# Patient Record
Sex: FEMALE | Race: WHITE | NOT HISPANIC OR LATINO | Employment: OTHER | URBAN - METROPOLITAN AREA
[De-identification: names, ages, dates, MRNs, and addresses within clinical notes are randomized per-mention and may not be internally consistent; named-entity substitution may affect disease eponyms.]

---

## 2021-06-13 ENCOUNTER — APPOINTMENT (OUTPATIENT)
Dept: CT IMAGING | Facility: HOSPITAL | Age: 86
End: 2021-06-13

## 2021-06-13 ENCOUNTER — APPOINTMENT (OUTPATIENT)
Dept: GENERAL RADIOLOGY | Facility: HOSPITAL | Age: 86
End: 2021-06-13

## 2021-06-13 ENCOUNTER — HOSPITAL ENCOUNTER (OUTPATIENT)
Facility: HOSPITAL | Age: 86
Setting detail: OBSERVATION
Discharge: NURSING, REHAB OR LONG-TERM CARE | End: 2021-06-14
Attending: INTERNAL MEDICINE | Admitting: NURSE PRACTITIONER

## 2021-06-13 DIAGNOSIS — S32.591A CLOSED FRACTURE OF MULTIPLE RAMI OF RIGHT PUBIS, INITIAL ENCOUNTER (HCC): ICD-10-CM
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
## 2021-06-13 DIAGNOSIS — S32.110A CLOSED NONDISPLACED ZONE I FRACTURE OF SACRUM, INITIAL ENCOUNTER (HCC): ICD-10-CM

## 2021-06-13 PROBLEM — S32.10XA SACRAL FRACTURE, CLOSED (HCC): Status: ACTIVE | Noted: 2021-06-13

## 2021-06-13 PROBLEM — F03.90 DEMENTIA (HCC): Chronic | Status: ACTIVE | Noted: 2021-06-13

## 2021-06-13 PROBLEM — M85.80 OSTEOPENIA: Chronic | Status: ACTIVE | Noted: 2021-06-13

## 2021-06-13 LAB
ALBUMIN SERPL-MCNC: 3.6 G/DL (ref 3.5–5.2)
ALBUMIN/GLOB SERPL: 1.3 G/DL
ALP SERPL-CCNC: 78 U/L (ref 39–117)
ALT SERPL W P-5'-P-CCNC: 12 U/L (ref 1–33)
ANION GAP SERPL CALCULATED.3IONS-SCNC: 9 MMOL/L (ref 5–15)
APTT PPP: 25 SECONDS (ref 24–31)
AST SERPL-CCNC: 18 U/L (ref 1–32)
BASOPHILS # BLD AUTO: 0.1 10*3/MM3 (ref 0–0.2)
BASOPHILS NFR BLD AUTO: 0.7 % (ref 0–1.5)
BILIRUB SERPL-MCNC: 0.5 MG/DL (ref 0–1.2)
BUN SERPL-MCNC: 39 MG/DL (ref 8–23)
BUN/CREAT SERPL: 33.1 (ref 7–25)
CALCIUM SPEC-SCNC: 9.9 MG/DL (ref 8.6–10.5)
CHLORIDE SERPL-SCNC: 106 MMOL/L (ref 98–107)
CO2 SERPL-SCNC: 28 MMOL/L (ref 22–29)
CREAT SERPL-MCNC: 1.18 MG/DL (ref 0.57–1)
DEPRECATED RDW RBC AUTO: 47.7 FL (ref 37–54)
EOSINOPHIL # BLD AUTO: 0.2 10*3/MM3 (ref 0–0.4)
EOSINOPHIL NFR BLD AUTO: 2.5 % (ref 0.3–6.2)
ERYTHROCYTE [DISTWIDTH] IN BLOOD BY AUTOMATED COUNT: 16.3 % (ref 12.3–15.4)
GFR SERPL CREATININE-BSD FRML MDRD: 43 ML/MIN/1.73
GLOBULIN UR ELPH-MCNC: 2.7 GM/DL
GLUCOSE SERPL-MCNC: 126 MG/DL (ref 65–99)
HCT VFR BLD AUTO: 37.3 % (ref 34–46.6)
HGB BLD-MCNC: 12.5 G/DL (ref 12–15.9)
HOLD SPECIMEN: NORMAL
INR PPP: <0.93 (ref 0.93–1.1)
LYMPHOCYTES # BLD AUTO: 1.4 10*3/MM3 (ref 0.7–3.1)
LYMPHOCYTES NFR BLD AUTO: 17.8 % (ref 19.6–45.3)
MAGNESIUM SERPL-MCNC: 2.2 MG/DL (ref 1.6–2.4)
MCH RBC QN AUTO: 28.3 PG (ref 26.6–33)
MCHC RBC AUTO-ENTMCNC: 33.4 G/DL (ref 31.5–35.7)
MCV RBC AUTO: 84.7 FL (ref 79–97)
MONOCYTES # BLD AUTO: 1 10*3/MM3 (ref 0.1–0.9)
MONOCYTES NFR BLD AUTO: 12.5 % (ref 5–12)
NEUTROPHILS NFR BLD AUTO: 5.4 10*3/MM3 (ref 1.7–7)
NEUTROPHILS NFR BLD AUTO: 66.5 % (ref 42.7–76)
NRBC BLD AUTO-RTO: 0 /100 WBC (ref 0–0.2)
PHOSPHATE SERPL-MCNC: 2.9 MG/DL (ref 2.5–4.5)
PLATELET # BLD AUTO: 298 10*3/MM3 (ref 140–450)
PMV BLD AUTO: 7.9 FL (ref 6–12)
POTASSIUM SERPL-SCNC: 4.6 MMOL/L (ref 3.5–5.2)
PROT SERPL-MCNC: 6.3 G/DL (ref 6–8.5)
PROTHROMBIN TIME: 10.1 SECONDS (ref 9.6–11.7)
RBC # BLD AUTO: 4.4 10*6/MM3 (ref 3.77–5.28)
SODIUM SERPL-SCNC: 143 MMOL/L (ref 136–145)
TROPONIN T SERPL-MCNC: <0.01 NG/ML (ref 0–0.03)
TSH SERPL DL<=0.05 MIU/L-ACNC: 0.01 UIU/ML (ref 0.27–4.2)
WBC # BLD AUTO: 8.2 10*3/MM3 (ref 3.4–10.8)

## 2021-06-13 PROCEDURE — 80053 COMPREHEN METABOLIC PANEL: CPT | Performed by: NURSE PRACTITIONER

## 2021-06-13 PROCEDURE — G0378 HOSPITAL OBSERVATION PER HR: HCPCS

## 2021-06-13 PROCEDURE — 85610 PROTHROMBIN TIME: CPT | Performed by: NURSE PRACTITIONER

## 2021-06-13 PROCEDURE — 85730 THROMBOPLASTIN TIME PARTIAL: CPT | Performed by: NURSE PRACTITIONER

## 2021-06-13 PROCEDURE — 72192 CT PELVIS W/O DYE: CPT

## 2021-06-13 PROCEDURE — 71045 X-RAY EXAM CHEST 1 VIEW: CPT

## 2021-06-13 PROCEDURE — 99219 PR INITIAL OBSERVATION CARE/DAY 50 MINUTES: CPT | Performed by: NURSE PRACTITIONER

## 2021-06-13 PROCEDURE — 84481 FREE ASSAY (FT-3): CPT | Performed by: INTERNAL MEDICINE

## 2021-06-13 PROCEDURE — 84443 ASSAY THYROID STIM HORMONE: CPT | Performed by: NURSE PRACTITIONER

## 2021-06-13 PROCEDURE — 83036 HEMOGLOBIN GLYCOSYLATED A1C: CPT | Performed by: NURSE PRACTITIONER

## 2021-06-13 PROCEDURE — U0005 INFEC AGEN DETEC AMPLI PROBE: HCPCS | Performed by: INTERNAL MEDICINE

## 2021-06-13 PROCEDURE — 99284 EMERGENCY DEPT VISIT MOD MDM: CPT

## 2021-06-13 PROCEDURE — 83735 ASSAY OF MAGNESIUM: CPT | Performed by: NURSE PRACTITIONER

## 2021-06-13 PROCEDURE — 84484 ASSAY OF TROPONIN QUANT: CPT | Performed by: NURSE PRACTITIONER

## 2021-06-13 PROCEDURE — 85025 COMPLETE CBC W/AUTO DIFF WBC: CPT | Performed by: NURSE PRACTITIONER

## 2021-06-13 PROCEDURE — U0004 COV-19 TEST NON-CDC HGH THRU: HCPCS | Performed by: INTERNAL MEDICINE

## 2021-06-13 PROCEDURE — 84100 ASSAY OF PHOSPHORUS: CPT | Performed by: NURSE PRACTITIONER

## 2021-06-13 RX ORDER — MAGNESIUM SULFATE HEPTAHYDRATE 40 MG/ML
2 INJECTION, SOLUTION INTRAVENOUS AS NEEDED
Status: DISCONTINUED | OUTPATIENT
Start: 2021-06-13 | End: 2021-06-14 | Stop reason: HOSPADM

## 2021-06-13 RX ORDER — ONDANSETRON 2 MG/ML
4 INJECTION INTRAMUSCULAR; INTRAVENOUS EVERY 6 HOURS PRN
Status: DISCONTINUED | OUTPATIENT
Start: 2021-06-13 | End: 2021-06-14 | Stop reason: HOSPADM

## 2021-06-13 RX ORDER — CHOLECALCIFEROL (VITAMIN D3) 125 MCG
5 CAPSULE ORAL NIGHTLY PRN
Status: DISCONTINUED | OUTPATIENT
Start: 2021-06-13 | End: 2021-06-14 | Stop reason: HOSPADM

## 2021-06-13 RX ORDER — MELATONIN
1000 DAILY
COMMUNITY

## 2021-06-13 RX ORDER — BUSPIRONE HYDROCHLORIDE 7.5 MG/1
7.5 TABLET ORAL 3 TIMES DAILY
COMMUNITY

## 2021-06-13 RX ORDER — CALCIUM GLUCONATE 20 MG/ML
1 INJECTION, SOLUTION INTRAVENOUS AS NEEDED
Status: DISCONTINUED | OUTPATIENT
Start: 2021-06-13 | End: 2021-06-14 | Stop reason: HOSPADM

## 2021-06-13 RX ORDER — HYDRALAZINE HYDROCHLORIDE 25 MG/1
25 TABLET, FILM COATED ORAL 3 TIMES DAILY
COMMUNITY

## 2021-06-13 RX ORDER — HYDROCODONE BITARTRATE AND ACETAMINOPHEN 7.5; 325 MG/1; MG/1
1 TABLET ORAL EVERY 4 HOURS PRN
Status: DISCONTINUED | OUTPATIENT
Start: 2021-06-13 | End: 2021-06-14 | Stop reason: HOSPADM

## 2021-06-13 RX ORDER — ONDANSETRON 4 MG/1
4 TABLET, FILM COATED ORAL EVERY 6 HOURS PRN
Status: DISCONTINUED | OUTPATIENT
Start: 2021-06-13 | End: 2021-06-14 | Stop reason: HOSPADM

## 2021-06-13 RX ORDER — PANTOPRAZOLE SODIUM 40 MG/1
40 TABLET, DELAYED RELEASE ORAL DAILY
COMMUNITY

## 2021-06-13 RX ORDER — ACETAMINOPHEN 650 MG/1
650 SUPPOSITORY RECTAL EVERY 4 HOURS PRN
Status: DISCONTINUED | OUTPATIENT
Start: 2021-06-13 | End: 2021-06-14 | Stop reason: HOSPADM

## 2021-06-13 RX ORDER — POTASSIUM CHLORIDE 20 MEQ/1
40 TABLET, EXTENDED RELEASE ORAL AS NEEDED
Status: DISCONTINUED | OUTPATIENT
Start: 2021-06-13 | End: 2021-06-14 | Stop reason: HOSPADM

## 2021-06-13 RX ORDER — NITROGLYCERIN 0.4 MG/1
0.4 TABLET SUBLINGUAL
Status: DISCONTINUED | OUTPATIENT
Start: 2021-06-13 | End: 2021-06-14 | Stop reason: HOSPADM

## 2021-06-13 RX ORDER — ALUMINA, MAGNESIA, AND SIMETHICONE 2400; 2400; 240 MG/30ML; MG/30ML; MG/30ML
15 SUSPENSION ORAL EVERY 6 HOURS PRN
Status: DISCONTINUED | OUTPATIENT
Start: 2021-06-13 | End: 2021-06-14 | Stop reason: HOSPADM

## 2021-06-13 RX ORDER — MAGNESIUM SULFATE 1 G/100ML
1 INJECTION INTRAVENOUS AS NEEDED
Status: DISCONTINUED | OUTPATIENT
Start: 2021-06-13 | End: 2021-06-14 | Stop reason: HOSPADM

## 2021-06-13 RX ORDER — HYDROCODONE BITARTRATE AND ACETAMINOPHEN 5; 325 MG/1; MG/1
1 TABLET ORAL EVERY 4 HOURS PRN
Status: DISCONTINUED | OUTPATIENT
Start: 2021-06-13 | End: 2021-06-14 | Stop reason: HOSPADM

## 2021-06-13 RX ORDER — ACETAMINOPHEN 160 MG/5ML
650 SOLUTION ORAL EVERY 4 HOURS PRN
Status: DISCONTINUED | OUTPATIENT
Start: 2021-06-13 | End: 2021-06-14 | Stop reason: HOSPADM

## 2021-06-13 RX ORDER — ACETAMINOPHEN 325 MG/1
650 TABLET ORAL EVERY 8 HOURS PRN
COMMUNITY

## 2021-06-13 RX ORDER — ACETAMINOPHEN 325 MG/1
650 TABLET ORAL EVERY 4 HOURS PRN
Status: DISCONTINUED | OUTPATIENT
Start: 2021-06-13 | End: 2021-06-14 | Stop reason: HOSPADM

## 2021-06-13 RX ORDER — CALCIUM GLUCONATE 20 MG/ML
2 INJECTION, SOLUTION INTRAVENOUS AS NEEDED
Status: DISCONTINUED | OUTPATIENT
Start: 2021-06-13 | End: 2021-06-14 | Stop reason: HOSPADM

## 2021-06-13 RX ORDER — LANOLIN ALCOHOL/MO/W.PET/CERES
1000 CREAM (GRAM) TOPICAL DAILY
COMMUNITY

## 2021-06-14 VITALS
BODY MASS INDEX: 23.92 KG/M2 | TEMPERATURE: 98.3 F | HEIGHT: 62 IN | DIASTOLIC BLOOD PRESSURE: 71 MMHG | HEART RATE: 65 BPM | OXYGEN SATURATION: 96 % | SYSTOLIC BLOOD PRESSURE: 128 MMHG | RESPIRATION RATE: 14 BRPM | WEIGHT: 130 LBS

## 2021-06-14 LAB
ANION GAP SERPL CALCULATED.3IONS-SCNC: 9 MMOL/L (ref 5–15)
BASOPHILS # BLD AUTO: 0 10*3/MM3 (ref 0–0.2)
BASOPHILS NFR BLD AUTO: 0.6 % (ref 0–1.5)
BUN SERPL-MCNC: 32 MG/DL (ref 8–23)
BUN/CREAT SERPL: 31.4 (ref 7–25)
CALCIUM SPEC-SCNC: 9.5 MG/DL (ref 8.6–10.5)
CHLORIDE SERPL-SCNC: 107 MMOL/L (ref 98–107)
CO2 SERPL-SCNC: 27 MMOL/L (ref 22–29)
CREAT SERPL-MCNC: 1.02 MG/DL (ref 0.57–1)
DEPRECATED RDW RBC AUTO: 45.9 FL (ref 37–54)
EOSINOPHIL # BLD AUTO: 0.2 10*3/MM3 (ref 0–0.4)
EOSINOPHIL NFR BLD AUTO: 3 % (ref 0.3–6.2)
ERYTHROCYTE [DISTWIDTH] IN BLOOD BY AUTOMATED COUNT: 15.8 % (ref 12.3–15.4)
GFR SERPL CREATININE-BSD FRML MDRD: 51 ML/MIN/1.73
GLUCOSE SERPL-MCNC: 123 MG/DL (ref 65–99)
HBA1C MFR BLD: 5.6 % (ref 3.5–5.6)
HCT VFR BLD AUTO: 34.8 % (ref 34–46.6)
HGB BLD-MCNC: 11.5 G/DL (ref 12–15.9)
LYMPHOCYTES # BLD AUTO: 1.1 10*3/MM3 (ref 0.7–3.1)
LYMPHOCYTES NFR BLD AUTO: 15.4 % (ref 19.6–45.3)
MAGNESIUM SERPL-MCNC: 2.1 MG/DL (ref 1.6–2.4)
MCH RBC QN AUTO: 27.9 PG (ref 26.6–33)
MCHC RBC AUTO-ENTMCNC: 33.1 G/DL (ref 31.5–35.7)
MCV RBC AUTO: 84.3 FL (ref 79–97)
MONOCYTES # BLD AUTO: 0.8 10*3/MM3 (ref 0.1–0.9)
MONOCYTES NFR BLD AUTO: 10.7 % (ref 5–12)
NEUTROPHILS NFR BLD AUTO: 5.2 10*3/MM3 (ref 1.7–7)
NEUTROPHILS NFR BLD AUTO: 70.3 % (ref 42.7–76)
NRBC BLD AUTO-RTO: 0.1 /100 WBC (ref 0–0.2)
PLATELET # BLD AUTO: 285 10*3/MM3 (ref 140–450)
PMV BLD AUTO: 8.1 FL (ref 6–12)
POTASSIUM SERPL-SCNC: 4 MMOL/L (ref 3.5–5.2)
RBC # BLD AUTO: 4.12 10*6/MM3 (ref 3.77–5.28)
SARS-COV-2 ORF1AB RESP QL NAA+PROBE: NOT DETECTED
SODIUM SERPL-SCNC: 143 MMOL/L (ref 136–145)
T3FREE SERPL-MCNC: 4.14 PG/ML (ref 2–4.4)
T4 FREE SERPL-MCNC: 2.02 NG/DL (ref 0.93–1.7)
WBC # BLD AUTO: 7.4 10*3/MM3 (ref 3.4–10.8)

## 2021-06-14 PROCEDURE — 85025 COMPLETE CBC W/AUTO DIFF WBC: CPT | Performed by: NURSE PRACTITIONER

## 2021-06-14 PROCEDURE — G0378 HOSPITAL OBSERVATION PER HR: HCPCS

## 2021-06-14 PROCEDURE — 99217 PR OBSERVATION CARE DISCHARGE MANAGEMENT: CPT | Performed by: INTERNAL MEDICINE

## 2021-06-14 PROCEDURE — 97535 SELF CARE MNGMENT TRAINING: CPT

## 2021-06-14 PROCEDURE — 97162 PT EVAL MOD COMPLEX 30 MIN: CPT

## 2021-06-14 PROCEDURE — 97166 OT EVAL MOD COMPLEX 45 MIN: CPT

## 2021-06-14 PROCEDURE — 84439 ASSAY OF FREE THYROXINE: CPT | Performed by: INTERNAL MEDICINE

## 2021-06-14 PROCEDURE — 99203 OFFICE O/P NEW LOW 30 MIN: CPT | Performed by: ORTHOPAEDIC SURGERY

## 2021-06-14 PROCEDURE — 83735 ASSAY OF MAGNESIUM: CPT | Performed by: NURSE PRACTITIONER

## 2021-06-14 PROCEDURE — 36415 COLL VENOUS BLD VENIPUNCTURE: CPT | Performed by: NURSE PRACTITIONER

## 2021-06-14 PROCEDURE — 80048 BASIC METABOLIC PNL TOTAL CA: CPT | Performed by: NURSE PRACTITIONER

## 2021-06-14 RX ORDER — HYDROCODONE BITARTRATE AND ACETAMINOPHEN 5; 325 MG/1; MG/1
1 TABLET ORAL EVERY 6 HOURS PRN
Qty: 15 TABLET | Refills: 0 | Status: SHIPPED | OUTPATIENT
Start: 2021-06-14 | End: 2021-06-20

## 2021-06-14 RX ORDER — LANOLIN ALCOHOL/MO/W.PET/CERES
1000 CREAM (GRAM) TOPICAL DAILY
Status: DISCONTINUED | OUTPATIENT
Start: 2021-06-14 | End: 2021-06-14 | Stop reason: HOSPADM

## 2021-06-14 RX ORDER — HYDRALAZINE HYDROCHLORIDE 25 MG/1
25 TABLET, FILM COATED ORAL 3 TIMES DAILY
Status: DISCONTINUED | OUTPATIENT
Start: 2021-06-14 | End: 2021-06-14 | Stop reason: HOSPADM

## 2021-06-14 RX ORDER — BUSPIRONE HYDROCHLORIDE 15 MG/1
7.5 TABLET ORAL 3 TIMES DAILY
Status: DISCONTINUED | OUTPATIENT
Start: 2021-06-14 | End: 2021-06-14 | Stop reason: HOSPADM

## 2021-06-14 RX ORDER — MELATONIN
1000 DAILY
Status: DISCONTINUED | OUTPATIENT
Start: 2021-06-14 | End: 2021-06-14 | Stop reason: HOSPADM

## 2021-06-14 RX ORDER — CLONIDINE HYDROCHLORIDE 0.1 MG/1
0.1 TABLET ORAL EVERY 6 HOURS PRN
Status: DISCONTINUED | OUTPATIENT
Start: 2021-06-14 | End: 2021-06-14 | Stop reason: HOSPADM

## 2021-06-14 RX ORDER — LABETALOL HYDROCHLORIDE 5 MG/ML
10 INJECTION, SOLUTION INTRAVENOUS EVERY 6 HOURS PRN
Status: DISCONTINUED | OUTPATIENT
Start: 2021-06-14 | End: 2021-06-14 | Stop reason: HOSPADM

## 2021-06-14 RX ORDER — PANTOPRAZOLE SODIUM 40 MG/1
40 TABLET, DELAYED RELEASE ORAL DAILY
Status: DISCONTINUED | OUTPATIENT
Start: 2021-06-14 | End: 2021-06-14 | Stop reason: HOSPADM

## 2021-06-14 RX ADMIN — HYDRALAZINE HYDROCHLORIDE 25 MG: 25 TABLET, FILM COATED ORAL at 07:32

## 2021-06-14 RX ADMIN — METOPROLOL TARTRATE 12.5 MG: 25 TABLET, FILM COATED ORAL at 02:01

## 2021-06-14 RX ADMIN — HYDROCODONE BITARTRATE AND ACETAMINOPHEN 1 TABLET: 5; 325 TABLET ORAL at 07:32

## 2021-06-14 RX ADMIN — CYANOCOBALAMIN TAB 1000 MCG 1000 MCG: 1000 TAB at 07:32

## 2021-06-14 RX ADMIN — BUSPIRONE HYDROCHLORIDE 7.5 MG: 15 TABLET ORAL at 07:32

## 2021-06-14 RX ADMIN — PANTOPRAZOLE SODIUM 40 MG: 40 TABLET, DELAYED RELEASE ORAL at 07:32

## 2021-06-14 RX ADMIN — Medication 1000 UNITS: at 07:32

## 2021-06-14 RX ADMIN — CLONIDINE HYDROCHLORIDE 0.1 MG: 0.1 TABLET ORAL at 07:32

## 2021-06-14 NOTE — CONSULTS
"     Patient ID: Luma Ventura is a 87 y.o. female.    Chief Complaint:    Chief Complaint   Patient presents with   • Fall       HPI:  Luma is an 87-year-old female admitted after a fall who initially reported some right-sided pelvic pain.  With medication overnight the pain is resolved per the patient  History reviewed. No pertinent past medical history.    History reviewed. No pertinent surgical history.    History reviewed. No pertinent family history.       Social History     Occupational History   • Not on file   Tobacco Use   • Smoking status: Never Smoker   • Smokeless tobacco: Never Used   Substance and Sexual Activity   • Alcohol use: Never   • Drug use: Not on file   • Sexual activity: Defer      Review of Systems   Cardiovascular: Negative for chest pain.   Musculoskeletal: Positive for arthralgias.       Objective:    /46 (BP Location: Right arm, Patient Position: Lying)   Pulse 57   Temp 97.7 °F (36.5 °C) (Oral)   Resp 12   Ht 157.5 cm (62\")   Wt 59 kg (130 lb)   SpO2 95%   BMI 23.78 kg/m²     Physical Examination:  She is a pleasant female.  She has no pain to palpation of the proximal femur or pelvic brim on the right side.  No pain on logroll of the hip.  Heron negative  Sensory and motor exam are intact all distributions. Dorsalis pedis and posterior tibialis pulses are palpable and capillary refill is less than two seconds to all digits  Imaging:  CT scan demonstrates pubic ramus fractures and a small sacral ala fracture right side    Assessment:  Stable pelvic fractures    Plan:  Physical therapy can commence weightbearing as tolerated.  DVT prophylaxis as medically appropriate.  See me in the office as needed in 6 weeks if symptoms continue      Disclaimer: Please note that areas of this note were completed with computer voice recognition software.  Quite often unanticipated grammatical, syntax, homophones, and other interpretive errors are inadvertently transcribed by the " computer software. Please excuse any errors that have escaped final proofreading.

## 2021-06-14 NOTE — PLAN OF CARE
Awaiting ortho consult- will follow up for PT evaluation once treatment plan and WB status are determined.

## 2021-06-14 NOTE — DISCHARGE SUMMARY
Date of Admission: 6/13/2021    Date of Discharge:  6/14/2021    Length of stay:  LOS: 0 days     Admission Diagnosis:   Closed nondisplaced zone I fracture of sacrum, initial encounter (CMS/[facility]) [S32.110A]  Fall, initial encounter [W19.XXXA]  Closed fracture of multiple rami of right pubis, initial encounter (CMS/[facility]) [S32.591A]      Discharge Diagnosis:     Closed fracture of multiple pubic rami on the right/nondisplaced fracture right sacral ala  - CT of the pelvis W/O shows acute nondisplaced fractures of the right superior and inferior pubic rami.  There is a subtle nondisplaced fracture through the right sacral ala   - ortho consulted and recommends weightbearing as tolerated  - follow up with Dr. Tamayo as needed in 6 weeks if symptoms continue  - hydrocodone prn pain  - PT/OT evaluated patient she will return to [facility] in stable condition    Hyperthyroidism  - TSH 0.010, Free T4 2.02  - not on synthroid  - follow up with endocrinology in 1-2 weeks    HTN  - continue hydralazine, lopressor     Anxiety  - continue Buspar     GERD  - continue ppi    Osteopenia     Dementia  - Fall precautions  - return to dementia unit at [facility]        Active Hospital Problems    Diagnosis  POA   • **Closed fracture of multiple pubic rami, right, initial encounter (CMS/[facility]) [S32.591A]  Yes   • Fall [W19.XXXA]  Yes   • Osteopenia [M85.80]  Yes   • Sacral fracture, closed (CMS/[facility]) [S32.10XA]  Yes   • Dementia (CMS/[facility]) [F03.90]  Yes      Resolved Hospital Problems   No resolved problems to display.       Hospital Course:     Ms. Ventura is a 87 y.o. female with past medical history of dementia who presents to [facility] ED from [facility].  Patient fell approximately 3 days prior and has been unable to get out of bed or walk since then. Pelvic x-ray performed at [facility] indicated possible pelvic fracture so patient was sent to [facility] ED for evaluation.   On arrival to the ED vitals temp 97.8, HR 90, RR  18, /63, O2 sat 95% on room air. CT of the pelvis shows acute nondisplaced fractures of the right superior and inferior pubic rami.  There is a subtle nondisplaced fracture through the right sacral ala with severe osteopenia.  Patient was evaluated by orthopedic surgery and PT/OT. Patient will be started on hydrocodone and can be weightbearing as tolerated. She will be discharged back to her SNF in stable condition. She can follow up with  Dr. Tamayo as needed in 6 weeks if continued symptoms.   Patient was noted to have low TSH and elevated free T4. She has no symptoms. She can follow up with endocrinology in 1-2 weeks.      Procedures Performed:  none         Consults:   Consults     Date and Time Order Name Status Description    6/13/2021  9:21 PM Inpatient Orthopedic Surgery Consult Completed     6/13/2021  8:54 PM Hospitalist (on-call MD unless specified) Completed           Vital Signs:  Temp:  [97.7 °F (36.5 °C)-98.3 °F (36.8 °C)] 98.3 °F (36.8 °C)  Heart Rate:  [57-90] 65  Resp:  [12-18] 14  BP: (111-198)/(44-72) 128/71        Physical Exam:  Physical Exam  Vitals reviewed.   Constitutional:       General: She is not in acute distress.     Appearance: She is normal weight.   HENT:      Head: Normocephalic and atraumatic.      Mouth/Throat:      Mouth: Mucous membranes are moist.   Cardiovascular:      Rate and Rhythm: Normal rate and regular rhythm.      Heart sounds: No murmur heard.     Pulmonary:      Effort: Pulmonary effort is normal. No respiratory distress.      Breath sounds: No wheezing or rales.   Abdominal:      General: Bowel sounds are normal. There is no distension.      Palpations: Abdomen is soft.      Tenderness: There is no abdominal tenderness.   Musculoskeletal:      Right lower leg: No edema.      Left lower leg: No edema.   Neurological:      Mental Status: She is alert.      Comments: AAO to self only   Psychiatric:         Mood and Affect: Mood normal.         Behavior: Behavior  normal.                 Pertinent Test Results:   Lab Results (last 72 hours)     Procedure Component Value Units Date/Time    T3, Free [[id number]] Collected: 06/13/21 2139    Specimen: Blood Updated: 06/14/21 1542    COVID PRE-OP / PRE-PROCEDURE SCREENING ORDER (NO ISOLATION) - Swab, Nasopharynx [[id number]]  (Normal) Collected: 06/13/21 2220    Specimen: Swab from Nasopharynx Updated: 06/14/21 1503    Narrative:      The following orders were created for panel order COVID PRE-OP / PRE-PROCEDURE SCREENING ORDER (NO ISOLATION) - Swab, Nasopharynx.  Procedure                               Abnormality         Status                     ---------                               -----------         ------                     COVID-19,APTIMA PANTHER,...[[id number]]  Normal              Final result                 Please view results for these tests on the individual orders.    COVID-19,APTIMA PANTHER,[facility], NP/OP SWAB IN UTM/VTM/[facility] TRANSPORT MEDIA,24 HR TAT - Swab, Nasopharynx [[id number]]  (Normal) Collected: 06/13/21 2220    Specimen: Swab from Nasopharynx Updated: 06/14/21 1503     COVID19 Not Detected    Narrative:      Fact sheet for providers: https://www.fda.gov/media/743565/download     Fact sheet for patients: https://www.fda.gov/media/301558/download    Test performed by RT PCR.    Hemoglobin A1c [[id number]]  (Normal) Collected: 06/13/21 2139    Specimen: Blood Updated: 06/14/21 1017     Hemoglobin A1C 5.6 %     Narrative:      Hemoglobin A1C Reference Range:    <5.7 %        Normal  5.7-6.4 %     Increased risk for diabetes  > 6.4 %        Diabetes       These guidelines have been recommended by the American Diabetic Association for Hgb A1c.      The following 2010 guidelines have been recommended by the American Diabetes Association for Hemoglobin A1c.    HBA1c 5.7-6.4% Increased risk for future diabetes (pre-diabetes)  HBA1c     >6.4% Diabetes      T4, Free [[id number]]  (Abnormal) Collected: 06/14/21  0613    Specimen: Blood Updated: 06/14/21 0942     Free T4 2.02 ng/dL     Narrative:      Results may be falsely increased if patient taking Biotin.      Basic Metabolic Panel [[id number]]  (Abnormal) Collected: 06/14/21 0613    Specimen: Blood Updated: 06/14/21 0649     Glucose 123 mg/dL      BUN 32 mg/dL      Creatinine 1.02 mg/dL      Sodium 143 mmol/L      Potassium 4.0 mmol/L      Chloride 107 mmol/L      CO2 27.0 mmol/L      Calcium 9.5 mg/dL      eGFR Non African Amer 51 mL/min/1.73      BUN/Creatinine Ratio 31.4     Anion Gap 9.0 mmol/L     Narrative:      GFR Normal >60  Chronic Kidney Disease <60  Kidney Failure <15      Magnesium [[id number]]  (Normal) Collected: 06/14/21 0613    Specimen: Blood Updated: 06/14/21 0649     Magnesium 2.1 mg/dL     CBC Auto Differential [[id number]]  (Abnormal) Collected: 06/14/21 0613    Specimen: Blood Updated: 06/14/21 0630     WBC 7.40 10*3/mm3      RBC 4.12 10*6/mm3      Hemoglobin 11.5 g/dL      Hematocrit 34.8 %      MCV 84.3 fL      MCH 27.9 pg      MCHC 33.1 g/dL      RDW 15.8 %      RDW-SD 45.9 fl      MPV 8.1 fL      Platelets 285 10*3/mm3      Neutrophil % 70.3 %      Lymphocyte % 15.4 %      Monocyte % 10.7 %      Eosinophil % 3.0 %      Basophil % 0.6 %      Neutrophils, Absolute 5.20 10*3/mm3      Lymphocytes, Absolute 1.10 10*3/mm3      Monocytes, Absolute 0.80 10*3/mm3      Eosinophils, Absolute 0.20 10*3/mm3      Basophils, Absolute 0.00 10*3/mm3      nRBC 0.1 /100 WBC     Extra Tubes [[id number]] Collected: 06/13/21 2139    Specimen: Blood Updated: 06/13/21 2247    Narrative:      The following orders were created for panel order Extra Tubes.  Procedure                               Abnormality         Status                     ---------                               -----------         ------                     [facility] - [facility][[id number]]                                   Final result                 Please view results for these tests on the individual  orders.    Gold Top - [facility] [[id number]] Collected: 06/13/21 2139    Specimen: Blood Updated: 06/13/21 2247     Extra Tube Hold for add-ons.     Comment: Auto resulted.       TSH [[id number]]  (Abnormal) Collected: 06/13/21 2139    Specimen: Blood Updated: 06/13/21 2221     TSH 0.010 uIU/mL     Troponin [[id number]]  (Normal) Collected: 06/13/21 2139    Specimen: Blood Updated: 06/13/21 2221     Troponin T <0.010 ng/mL     Narrative:      Troponin T Reference Range:  <= 0.03 ng/mL-   Negative for AMI  >0.03 ng/mL-     Abnormal for myocardial necrosis.  Clinicians would have to utilize clinical acumen, EKG, Troponin and serial changes to determine if it is an Acute Myocardial Infarction or myocardial injury due to an underlying chronic condition.       Results may be falsely decreased if patient taking Biotin.      Comprehensive Metabolic Panel [[id number]]  (Abnormal) Collected: 06/13/21 2139    Specimen: Blood Updated: 06/13/21 2216     Glucose 126 mg/dL      BUN 39 mg/dL      Creatinine 1.18 mg/dL      Sodium 143 mmol/L      Potassium 4.6 mmol/L      Chloride 106 mmol/L      CO2 28.0 mmol/L      Calcium 9.9 mg/dL      Total Protein 6.3 g/dL      Albumin 3.60 g/dL      ALT (SGPT) 12 U/L      AST (SGOT) 18 U/L      Alkaline Phosphatase 78 U/L      Total Bilirubin 0.5 mg/dL      eGFR Non African Amer 43 mL/min/1.73      Globulin 2.7 gm/dL      A/G Ratio 1.3 g/dL      BUN/Creatinine Ratio 33.1     Anion Gap 9.0 mmol/L     Narrative:      GFR Normal >60  Chronic Kidney Disease <60  Kidney Failure <15      Magnesium [[id number]]  (Normal) Collected: 06/13/21 2139    Specimen: Blood Updated: 06/13/21 2216     Magnesium 2.2 mg/dL     Phosphorus [[id number]]  (Normal) Collected: 06/13/21 2139    Specimen: Blood Updated: 06/13/21 2216     Phosphorus 2.9 mg/dL     Protime-INR [[id number]]  (Abnormal) Collected: 06/13/21 2139    Specimen: Blood Updated: 06/13/21 2203     Protime 10.1 Seconds      INR <0.93    aPTT [[id number]]   (Normal) Collected: 06/13/21 2139    Specimen: Blood Updated: 06/13/21 2203     PTT 25.0 seconds     CBC Auto Differential [[id number]]  (Abnormal) Collected: 06/13/21 2139    Specimen: Blood Updated: 06/13/21 2149     WBC 8.20 10*3/mm3      RBC 4.40 10*6/mm3      Hemoglobin 12.5 g/dL      Hematocrit 37.3 %      MCV 84.7 fL      MCH 28.3 pg      MCHC 33.4 g/dL      RDW 16.3 %      RDW-SD 47.7 fl      MPV 7.9 fL      Platelets 298 10*3/mm3      Neutrophil % 66.5 %      Lymphocyte % 17.8 %      Monocyte % 12.5 %      Eosinophil % 2.5 %      Basophil % 0.7 %      Neutrophils, Absolute 5.40 10*3/mm3      Lymphocytes, Absolute 1.40 10*3/mm3      Monocytes, Absolute 1.00 10*3/mm3      Eosinophils, Absolute 0.20 10*3/mm3      Basophils, Absolute 0.10 10*3/mm3      nRBC 0.0 /100 WBC             Imaging Results (All)     Procedure Component Value Units Date/Time    XR Chest 1 View [[id number]] Collected: 06/14/21 0822     Updated: 06/14/21 0826    Narrative:      DATE OF EXAM:  6/13/2021 9:09 PM     PROCEDURE:  XR CHEST 1 VW-     INDICATIONS:  Altered mental status; W19.XXXA-Unspecified fall, initial encounter;  S32.591A-Other specified fracture of right pubis, initial encounter for  closed fracture; S32.110A-Nondisplaced zone i fracture of sacrum,  initial encounter for closed fracture     COMPARISON:  None available     TECHNIQUE:   Single radiographic AP view of the chest was obtained.     FINDINGS:  Cardiac silhouette appears mildly enlarged, even allowing for portable  AP technique. There are calcified hilar lymph nodes, likely sequela of  prior granulomatous disease. The lungs appear grossly clear. No  pneumothorax or large pleural effusion is seen.        Impression:      Mildly enlarged cardiac silhouette. No definite acute pulmonary  abnormality.     Electronically Signed By-Rosalia Osorio MD On:6/14/2021 8:24 AM  This report was finalized on [id number] by  Rosalia Osorio MD.    CT Pelvis Without Contrast  [[id number]] Collected: 06/13/21 2004     Updated: 06/13/21 2011    Narrative:         DATE OF EXAM:  6/13/2021 7:33 PM     PROCEDURE:  CT PELVIS WO CONTRAST-     INDICATIONS:   trauma/right sided pelvic and hip pain     COMPARISON:   No Comparisons Available     TECHNIQUE:  Routine transaxial slices were obtained through the pelvis without the  administration of intravenous contrast. Reconstructed coronal and  sagittal images were also obtained. Automated exposure control and  iterative construction methods were used.     FINDINGS:  There is severe osteopenia.  There are minimally displaced acute  fractures of the superior and inferior pubic rami on the right.  There  is also a subtle nondisplaced fracture of the right sacral ala.  There  is complete ankylosis of the bilateral sacroiliac joints.  No left-sided  pelvic fractures identified.  There is an intramedullary marlon with a  single locking screw in place through the right femoral neck from prior  fracture repair.  No definite hardware complication.  The inferior  extent of the hardware is not included within the field-of-view.  There  is severe narrowing of right hip joint space but without osteophyte  formation.  There is severe narrowing of the left hip joint space.  No  definite left hip fracture identified.  Visualized portions of the L3,  L4, and L5 vertebral bodies appear intact.  There is severe degenerative  disc disease of the lower lumbar spine.     Urinary bladder appears within normal limits.  There are coarse  calcifications within the uterine fundus most likely related to  calcified uterine fibroids.  Visualized portions of the GI tract are  unremarkable.  There is a low-density 2.7 cm mass arising from the lower  pole the right kidney favored to be a cyst.        Impression:         1.  Acute nondisplaced fractures of the right superior and inferior  pubic rami.  There is a subtle nondisplaced fracture through the right  sacral ala.  2.  Severe  osteopenia.  3.  Severe degenerative joint space narrowing of both hips  4.  Postoperative changes of the right proximal femur from prior  fracture repair.  No definite hardware complication.     Electronically Signed By-Garrison Giles MD On:6/13/2021 8:09 PM  This report was finalized on [id number] by  Garrison Giles MD.                Discharge Disposition:  Skilled Nursing Facility (DC - External)    Discharge Medications:     Discharge Medications      New Medications      Instructions Start Date   HYDROcodone-acetaminophen 5-325 MG per tablet  Commonly known as: NORCO   1 tablet, Oral, Every 6 Hours PRN         Continue These Medications      Instructions Start Date   acetaminophen 325 MG tablet  Commonly known as: TYLENOL   650 mg, Oral, Every 8 Hours PRN      busPIRone 7.5 MG tablet  Commonly known as: BUSPAR   7.5 mg, Oral, 3 Times Daily      cholecalciferol 25 MCG (1000 UT) tablet  Commonly known as: VITAMIN D3   1,000 Units, Oral, Daily      hydrALAZINE 25 MG tablet  Commonly known as: APRESOLINE   25 mg, Oral, 3 Times Daily      metoprolol tartrate 25 MG tablet  Commonly known as: LOPRESSOR   12.5 mg, Oral, 2 Times Daily      pantoprazole 40 MG EC tablet  Commonly known as: PROTONIX   40 mg, Oral, Daily      vitamin B-12 1000 MCG tablet  Commonly known as: CYANOCOBALAMIN   1,000 mcg, Oral, Daily             Discharge Diet:   Diet Instructions     Diet: Regular      Discharge Diet: Regular          Activity at Discharge:   Activity Instructions     Other Activity Instructions      Activity Instructions: Physical therapy can commence weightbearing as tolerated.          Follow-up Appointments  No future appointments.    Additional Instructions for the Follow-ups that You Need to Schedule     Discharge Follow-up with PCP   As directed       Currently Documented PCP:    Provider, No Known    PCP Phone Number:    None     Follow Up Details: in 3-5 days or sooner if needed         Discharge Follow-up with  Specified Provider: Dr. Tamayo in 6 weeks if continued pain   As directed      To: Dr. Tamayo in 6 weeks if continued pain         Discharge Follow-up with Specified Provider: Endrocrinology; 1 Week   As directed      To: Endrocrinology    Follow Up: 1 Week               Test Results Pending at Discharge:  None      Condition on Discharge:    Stable      Risk for Readmission (LACE): Score: 4 (6/14/2021  6:01 AM)          Alessandra Maldonado DO  06/14/21  16:37 EDT    Time: Discharge 32 min with face-to-face history/exam, writing all prescriptions, and documenting discharge data including care coordination

## 2021-06-14 NOTE — H&P
[facility]      Patient Name: Luma Ventura  : 1933  MRN: [id number]  Primary Care Physician: Norma, Marlen Known  Date of admission: 2021    Patient Care Team:  Provider, No Known as PCP - General          Subjective   History Present Illness     Chief Complaint:   Chief Complaint   Patient presents with   • Fall         Chief complaint: fall      History of present illness:    Ms. Ventura is a 87 y.o. female with past medical history of dementia who presents to [facility] ED from [facility].  Patient fell approximately 3 days prior and has been able to get out of bed or walk since then.  Pelvic x-ray performed at [facility] indicated possible pelvic fracture so patient was sent to [facility] ED for evaluation.  Requests obtain records from [facility] ordered, no prior PMH, MAR or H&P available.  Patient has dementia and is unable to perform review of systems at this time.    On arrival to the ED vitals temp 97.8, HR 90, RR 18, /63, O2 sat 95% on room air.  CT of the pelvis W/O shows acute nondisplaced fractures of the right superior and inferior pubic rami.  There is a subtle nondisplaced fracture through the right sacral ala. Severe osteopenia.  Severe degenerative joint space narrowing of both hips. Postoperative changes of the right proximal femur from prior fracture repair  No definite hardware complication.        History of Present Illness    Review of Systems   Unable to perform ROS: dementia           Personal History     Past Medical History: History reviewed. No pertinent past medical history.    Surgical History:    No past surgical history on file.        Family History:  Family History was reviewed.     Social History:        Medications:  Prior to Admission medications    Not on File       Allergies:  No Known Allergies    Objective   Objective     Vital Signs  Temp:  [97.8 °F (36.6 °C)] 97.8 °F (36.6 °C)  Heart  Rate:  [79-90] 87  Resp:  [18] 18  BP: (165-182)/(55-63) 178/61  SpO2:  [95 %] 95 %  on   ;   Device (Oxygen Therapy): room air  Body mass index is 23.78 kg/m².    Physical Exam  Vitals and nursing note reviewed.   Constitutional:       Appearance: She is ill-appearing.   HENT:      Head: Normocephalic.      Right Ear: External ear normal.      Left Ear: External ear normal.      Nose: Nose normal.      Mouth/Throat:      Mouth: Mucous membranes are dry.      Pharynx: Oropharynx is clear.   Eyes:      Pupils: Pupils are equal, round, and reactive to light.   Cardiovascular:      Rate and Rhythm: Normal rate and regular rhythm.      Pulses: Normal pulses.      Heart sounds: Normal heart sounds.   Pulmonary:      Effort: Pulmonary effort is normal.      Breath sounds: Normal breath sounds and air entry.   Abdominal:      General: Abdomen is flat. Bowel sounds are normal.      Palpations: Abdomen is soft.   Musculoskeletal:      Cervical back: Normal range of motion.      Right lower leg: No edema.      Left lower leg: No edema.      Comments: Multiple pelvic fractures   Skin:     General: Skin is dry.      Coloration: Skin is pale.   Neurological:      Mental Status: She is disoriented and confused.   Psychiatric:         Mood and Affect: Affect is labile.         Behavior: Behavior is cooperative.         Cognition and Memory: Cognition is impaired. Memory is impaired.           Results Review:  I have personally reviewed most recent clinical lab results.              Invalid input(s):  ALKPHOS  CrCl cannot be calculated (No successful lab value found.).  Brief Urine Lab Results     None          Microbiology Results (last 10 days)     ** No results found for the last 240 hours. **          ECG/EMG Results (most recent)     None                  CT Pelvis Without Contrast    Result Date: 6/13/2021   1.  Acute nondisplaced fractures of the right superior and inferior pubic rami.  There is a subtle nondisplaced  fracture through the right sacral ala. 2.  Severe osteopenia. 3.  Severe degenerative joint space narrowing of both hips 4.  Postoperative changes of the right proximal femur from prior fracture repair.  No definite hardware complication.  Electronically Signed By-Garrison Giles MD On:6/13/2021 8:09 PM This report was finalized on [id number] by  Garrison Giles MD.    I have personally reviewed radiographic images and agree with findings.    CrCl cannot be calculated (No successful lab value found.).    Assessment/Plan   Assessment/Plan       Active Hospital Problems    Diagnosis  POA   • **Closed fracture of multiple pubic rami, right, initial encounter (CMS/[facility]) [S32.591A]  Yes     Priority: High   • Sacral fracture, closed (CMS/[facility]) [S32.10XA]  Yes     Priority: High   • Fall [W19.XXXA]  Yes     Priority: Medium   • Osteopenia [M85.80]  Yes     Priority: Low   • Dementia (CMS/[facility]) [F03.90]  Yes     Priority: Low      Resolved Hospital Problems   No resolved problems to display.       Closed fracture of multiple pubic rami on the right/nondisplaced fracture right sacral ala:  - CT of the pelvis W/O shows acute nondisplaced fractures of the right superior and inferior pubic rami.  There is a subtle nondisplaced fracture through the right sacral ala   -Consult orthopedic surgery   -PT/OT evaluation for mobility evaluation and post fall assessment  -Strict bedrest  -Ice to affected areas  -Inspect skin integrity every shift  -SCDs ordered for VTE prophylaxis  -Pain medication for mild, moderate, severe pain ordered  -Regular diet ordered  -N.p.o. after midnight for possible surgical intervention  -Obtain records from [facility]    Fall:  -Fall precautions  -UA, TSH, troponin, PT/INR, magnesium, phosphorus, ionized calcium calcium, A1c, CMP, CBC, A1c ordered  - EKG, chest x-ray  -Continuous continuous cardiac monitoring  -Vital signs every 4 hours    Osteopenia:  -Continue to monitor    Dementia:  -Fall  precautions        VTE Prophylaxis -   Mechanical Order History:     None      Pharmalogical Order History:     None          CODE STATUS:    Code Status and Medical Interventions:   Ordered at: 06/13/21 1360     Level Of Support Discussed With:    Patient     Code Status:    CPR     Medical Interventions (Level of Support Prior to Arrest):    Full       This patient has been reviewed wearing appropriate personal protective equipment and findings discussed with the ED provider.      I discussed the patient's findings and my recommendations with the patient.    Signature:Electronically signed by ROMAINE Martins, 06/14/21, 12:25 AM EDT.      [facility] Hospitalist Team

## 2021-06-14 NOTE — THERAPY EVALUATION
Patient Name: Luma Ventura  : 1933    MRN: [id number]                              Today's Date: 2021       Admit Date: 2021    Visit Dx:     ICD-10-CM ICD-9-CM   1. Fall, initial encounter  W19.XXXA E888.9   2. Closed fracture of multiple rami of right pubis, initial encounter (CMS/HCC)  S32.591A 808.2   3. Closed nondisplaced zone I fracture of sacrum, initial encounter (CMS/[facility])  S32.110A 805.6     Patient Active Problem List   Diagnosis   • Fall   • Closed fracture of multiple pubic rami, right, initial encounter (CMS/[facility])   • Osteopenia   • Sacral fracture, closed (CMS/[facility])   • Dementia (CMS/[facility])     History reviewed. No pertinent past medical history.  History reviewed. No pertinent surgical history.  General Information     Row Name 21 1445          OT Time and Intention    Document Type  evaluation  -ES     Mode of Treatment  occupational therapy;co-treatment  -ES     Row Name 21 1445          General Information    Patient Profile Reviewed  yes  -ES     Prior Level of Function  -- unable to determine  -ES     Existing Precautions/Restrictions  fall WBAT  -ES     Barriers to Rehab  cognitive status  -ES     Row Name 21 1445          Occupational Profile    Reason for Services/Referral (Occupational Profile)  Pt is 86 yo female who fell at SNF suffering closed pubic rami fx. She demos cognitive impairment, and is unable to provide PLOF. Per nurse, pt was resident of SNF in memory care unit.  -ES     Row Name 21 1445          Living Environment    Lives With  facility resident  -ES     Row Name 21 1445          Home Main Entrance    Number of Stairs, Main Entrance  none  -ES     Row Name 21 1445          Stairs Within Home, Primary    Number of Stairs, Within Home, Primary  none  -ES     Row Name 21 144          Cognition    Orientation Status (Cognition)  oriented to;person  -ES     Row Name 21 1442          Safety Issues, Functional  Mobility    Impairments Affecting Function (Mobility)  cognition;endurance/activity tolerance;balance  -ES     Cognitive Impairments, Mobility Safety/Performance  impulsivity;insight into deficits/self-awareness;problem-solving/reasoning;safety precaution awareness;safety precaution follow-through  -ES       User Key  (r) = Recorded By, (t) = Taken By, (c) = Cosigned By    Initials Name Provider Type     Drea Sanderson OT Occupational Therapist          Mobility/ADL's     Row Name 06/14/21 1448          Bed Mobility    Bed Mobility  supine-sit  -ES     Supine-Sit [place] (Bed Mobility)  moderate assist (50% patient effort)  -ES     Row Name 06/14/21 1448          Transfers    Transfers  bed-chair transfer;sit-stand transfer;toilet transfer  -ES     Bed-Chair [place] (Transfers)  minimum assist (75% patient effort) Stand pivot transfer  -ES     Sit-Stand [place] (Transfers)  moderate assist (50% patient effort);minimum assist (75% patient effort)  -ES     [place] Level (Toilet Transfer)  minimum assist (75% patient effort);moderate assist (50% patient effort)  -     Row Name 06/14/21 1448          Toilet Transfer    Type (Toilet Transfer)  stand-sit  -ES     Row Name 06/14/21 1448          Activities of Daily Living    BADL Assessment/Intervention  upper body dressing;lower body dressing;toileting  -     Row Name 06/14/21 1448          Mobility    Extremity Weight-bearing Status  right lower extremity  -ES     Right Lower Extremity (Weight-bearing Status)  weight-bearing as tolerated (WBAT)  -     Row Name 06/14/21 1448          Upper Body Dressing Assessment/Training    [place] Level (Upper Body Dressing)  minimum assist (75% patient effort)  -ES     Row Name 06/14/21 1448          Lower Body Dressing Assessment/Training    [place] Level (Lower Body Dressing)  maximum assist (25% patient effort)  -     Row Name 06/14/21 1448          Toileting Assessment/Training     [place] Level (Toileting)  moderate assist (50% patient effort)  -ES     Comment (Toileting)  Pt c/o urgency and incontinence, stating she feels she has UTI  -ES       User Key  (r) = Recorded By, (t) = Taken By, (c) = Cosigned By    Initials Name Provider Type    Drea Babb OT Occupational Therapist        Obj/Interventions     Row Name 06/14/21 1449          Sensory Assessment (Somatosensory)    Sensory Assessment (Somatosensory)  UE sensation intact  -ES     Row Name 06/14/21 1449          Vision Assessment/Intervention    Visual Impairment/Limitations  WNL  -ES     Row Name 06/14/21 1449          Range of Motion Comprehensive    General Range of Motion  bilateral upper extremity ROM WNL  -ES     Row Name 06/14/21 1449          Strength Comprehensive (MMT)    Comment, General Manual Muscle Testing (MMT) Assessment  BUE 4/5  -ES     Row Name 06/14/21 1449          Balance    Balance Assessment  sitting static balance;sitting dynamic balance;standing static balance;standing dynamic balance  -ES     Static Sitting Balance  WFL  -ES     Dynamic Sitting Balance  mild impairment  -ES     Static Standing Balance  mild impairment  -ES     Dynamic Standing Balance  moderate impairment  -ES     Balance Interventions  sitting;standing;static;dynamic  -ES       User Key  (r) = Recorded By, (t) = Taken By, (c) = Cosigned By    Initials Name Provider Type    Drea Babb OT Occupational Therapist        Goals/Plan     Row Name 06/14/21 1501          Bathing Goal 1 (OT)    Activity/Device (Bathing Goal 1, OT)  bathing skills, all  -ES     [place] Level/Cues Needed (Bathing Goal 1, OT)  moderate assist (50-74% patient effort)  -ES     Time Frame (Bathing Goal 1, OT)  2 weeks  -ES     Row Name 06/14/21 1501          Dressing Goal 1 (OT)    Activity/Device (Dressing Goal 1, OT)  upper body dressing  -ES     [place]/Cues Needed (Dressing Goal 1, OT)  modified independence  -ES     Time Frame  "(Dressing Goal 1, OT)  2 weeks  -ES     Row Name 06/14/21 1501          Toileting Goal 1 (OT)    Activity/Device (Toileting Goal 1, OT)  toileting skills, all  -ES     [place] Level/Cues Needed (Toileting Goal 1, OT)  minimum assist (75% or more patient effort)  -ES     Time Frame (Toileting Goal 1, OT)  2 weeks  -ES     Row Name 06/14/21 1501          Therapy Assessment/Plan (OT)    Planned Therapy Interventions (OT)  activity tolerance training;BADL retraining;functional balance retraining;manual therapy/joint mobilization;neuromuscular control/coordination retraining;occupation/activity based interventions;passive ROM/stretching;patient/caregiver education/training;ROM/therapeutic exercise;strengthening exercise;wheelchair assessment/training  -ES       User Key  (r) = Recorded By, (t) = Taken By, (c) = Cosigned By    Initials Name Provider Type    Drea Babb, OT Occupational Therapist        Clinical Impression     Row Name 06/14/21 5801          Plan of Care Review    Outcome Summary  Pt is 88 yo female who fell at SNF suffering closed pubic rami fxs and R sacral fx. She demos cognitive impairment, and is unable to provide PLOF. Per nurse, pt was resident of SNF in memory care unit. Pt with Mod A for bed mobility and Min-Mod A for transfers. Ambulates to BR and has c/o urinary / urge incontinence, \"I must have a bladder infection.\" Patient requires Mod A for LB ADLs and Min A for UB ADLs. She is emotionally labile and not oriented to time/place/situation. Appears she would benefit from rehab onec returning to SNF, and OT spoke with NSG about possible UA due to pt's complaints and cloudy urine.  -ES     Row Name 06/14/21 1871          Therapy Assessment/Plan (OT)    Rehab Potential (OT)  good, to achieve stated therapy goals  -ES     Therapy Frequency (OT)  3 times/wk  -ES     Row Name 06/14/21 7767          Therapy Plan Review/Discharge Plan (OT)    Anticipated Discharge Disposition (OT)  " inpatient rehabilitation facility  -ES     Row Name 06/14/21 1450          Vital Signs    O2 Delivery Pre Treatment  room air  -ES     O2 Delivery Intra Treatment  room air  -ES     O2 Delivery Post Treatment  room air  -ES     Pre Patient Position  Supine  -ES     Intra Patient Position  Standing  -ES     Post Patient Position  Sitting  -ES     Row Name 06/14/21 1450          Positioning and Restraints    Pre-Treatment Position  in bed  -ES     Post Treatment Position  chair  -ES     In Chair  notified nsg;call light within reach;encouraged to call for assist;exit alarm on  -ES       User Key  (r) = Recorded By, (t) = Taken By, (c) = Cosigned By    Initials Name Provider Type    Drea Babb OT Occupational Therapist        Outcome Measures     Row Name 06/14/21 1502          How much help from another is currently needed...    Putting on and taking off regular lower body clothing?  2  -ES     Bathing (including washing, rinsing, and drying)  2  -ES     Toileting (which includes using toilet bed pan or urinal)  2  -ES     Putting on and taking off regular upper body clothing  3  -ES     Taking care of personal grooming (such as brushing teeth)  3  -ES     Eating meals  3  -ES     AM-PAC 6 Clicks Score (OT)  15  -ES     Row Name 06/14/21 1211          How much help from another person do you currently need...    Turning from your back to your side while in flat bed without using bedrails?  2  -MM     Moving from lying on back to sitting on the side of a flat bed without bedrails?  2  -MM     Moving to and from a bed to a chair (including a wheelchair)?  2  -MM     Standing up from a chair using your arms (e.g., wheelchair, bedside chair)?  2  -MM     Climbing 3-5 steps with a railing?  1  -MM     To walk in hospital room?  2  -MM     AM-PAC 6 Clicks Score (PT)  11  -MM     Row Name 06/14/21 1502 06/14/21 1211       Functional Assessment    Outcome Measure Options  AM-PAC 6 Clicks Daily Activity (OT)  -ES   AM-PAC 6 Clicks Basic Mobility (PT)  -MM      User Key  (r) = Recorded By, (t) = Taken By, (c) = Cosigned By    Initials Name Provider Type    ES Drea Sanderson OT Occupational Therapist    Andria Ravi, PT Student PT Student        Occupational Therapy Education                 Title: PT OT SLP Therapies (In Progress)     Topic: Occupational Therapy (In Progress)     Point: ADL training (In Progress)     Description:   Instruct learner(s) on proper safety adaptation and remediation techniques during self care or transfers.   Instruct in proper use of assistive devices.              Learning Progress Summary           Patient Acceptance, E,TB, NR by  at 6/14/2021 1504                   Point: Home exercise program (Not Started)     Description:   Instruct learner(s) on appropriate technique for monitoring, assisting and/or progressing therapeutic exercises/activities.              Learner Progress:  Not documented in this visit.          Point: Precautions (In Progress)     Description:   Instruct learner(s) on prescribed precautions during self-care and functional transfers.              Learning Progress Summary           Patient Acceptance, E,TB, NR by  at 6/14/2021 1504                   Point: Body mechanics (In Progress)     Description:   Instruct learner(s) on proper positioning and spine alignment during self-care, functional mobility activities and/or exercises.              Learning Progress Summary           Patient Acceptance, E,TB, NR by  at 6/14/2021 1504                               User Key     Initials Effective Dates Name Provider Type Discipline     03/01/19 -  Drea Sanderson OT Occupational Therapist OT              OT Recommendation and Plan  Planned Therapy Interventions (OT): activity tolerance training, BADL retraining, functional balance retraining, manual therapy/joint mobilization, neuromuscular control/coordination retraining, occupation/activity based  "interventions, passive ROM/stretching, patient/caregiver education/training, ROM/therapeutic exercise, strengthening exercise, wheelchair assessment/training  Therapy Frequency (OT): 3 times/wk  Plan of Care Review  Outcome Summary: Pt is 86 yo female who fell at SNF suffering closed pubic rami fxs and R sacral fx. She demos cognitive impairment, and is unable to provide PLOF. Per nurse, pt was resident of SNF in memory care unit. Pt with Mod A for bed mobility and Min-Mod A for transfers. Ambulates to BR and has c/o urinary / urge incontinence, \"I must have a bladder infection.\" Patient requires Mod A for LB ADLs and Min A for UB ADLs. She is emotionally labile and not oriented to time/place/situation. Appears she would benefit from rehab onec returning to SNF, and OT spoke with NSG about possible UA due to pt's complaints and cloudy urine.     Time Calculation:   Time Calculation- OT     Row Name 06/14/21 1503             Time Calculation- OT    OT Start Time  1125  -ES      OT Stop Time  1150  -ES      OT Time Calculation (min)  25 min  -ES      Total Timed Code Minutes- OT  10 minute(s)  -ES      OT Received On  06/14/21  -ES      OT - Next Appointment  06/16/21  -ES      OT Goal Re-Cert Due Date  06/28/21  -ES        User Key  (r) = Recorded By, (t) = Taken By, (c) = Cosigned By    Initials Name Provider Type    ES Drea Sanderson OT Occupational Therapist        Therapy Charges for Today     Code Description Service Date Service Provider Modifiers Qty    [id number]  OT SELF CARE/MGMT/TRAIN EA 15 MIN 6/14/2021 Drea Sanderson OT GO 1    [id number]  OT EVAL MOD COMPLEXITY 3 6/14/2021 Drea Sanderson OT GO 1               Drea Sanderson OT  6/14/2021  "

## 2021-06-14 NOTE — PLAN OF CARE
Goal Outcome Evaluation:  Plan of Care Reviewed With: patient (May need review; pt poor historian)           Outcome Summary: 86 y/o female presents with closed nondisplaced fracture of sacrum and multiple rami of R pubis; fall three days ago. PMH dementia. Pt WBAT status. Pt oriented to self only. PLOF unknown; pt from [facility]; pt poor historian. Currently, pt is mod A bed mobility with increased time needed and assist to lift R leg; min A x2 stand pivot transfer from bed to chair; min A ambulation with RW; mod A STS from toilet. Pt remained in forward flexed posture during ambulation, but responded well to cues. Pt pain increased with STS from toilet; pt did not complain of much pain otherwise during treatment. Pt did become emotionally labile repeatedly during treatment, but was able to be redirected. OT noted that urine was cloudy and reported to RN due to concern for UTI. IP rehab recommended upon discharge; pt presents with pain, weakness, and a suspected decline in functional mobility from baseline.

## 2021-06-14 NOTE — NURSING NOTE
Spoke with Elizabeth at [facility] updated her on admission and surgical consult, states patient was sent to them initially as a Adult protective services, but that  does now have POA.

## 2021-06-14 NOTE — THERAPY EVALUATION
Patient Name: Luma Ventura  : 1933    MRN: [id number]                              Today's Date: 2021       Admit Date: 2021    Visit Dx:     ICD-10-CM ICD-9-CM   1. Fall, initial encounter  W19.XXXA E888.9   2. Closed fracture of multiple rami of right pubis, initial encounter (CMS/HCC)  S32.591A 808.2   3. Closed nondisplaced zone I fracture of sacrum, initial encounter (CMS/HCC)  S32.110A 805.6     Patient Active Problem List   Diagnosis   • Fall   • Closed fracture of multiple pubic rami, right, initial encounter (CMS/[facility])   • Osteopenia   • Sacral fracture, closed (CMS/HCC)   • Dementia (CMS/HCC)     History reviewed. No pertinent past medical history.  History reviewed. No pertinent surgical history.  General Information     Row Name 21 1159          Physical Therapy Time and Intention    Document Type  evaluation  -MM     Mode of Treatment  co-treatment;physical therapy  -     Row Name 21 1159          General Information    Patient Profile Reviewed  yes  -MM     Prior Level of Function  -- PLOF unknown; pt poor historian  -MM     Existing Precautions/Restrictions  other (see comments);fall WBAT  -MM     Barriers to Rehab  cognitive status  -MM     Row Name 21 1159          Living Environment    Lives With  facility resident  -MM     Row Name 21 1159          Cognition    Orientation Status (Cognition)  oriented to;person;disoriented to;place;situation;time  -MM     Row Name 21 1159          Safety Issues, Functional Mobility    Impairments Affecting Function (Mobility)  cognition;endurance/activity tolerance;balance  -MM     Cognitive Impairments, Mobility Safety/Performance  other (see comments) PMH dementia; emotionally labile  -MM       User Key  (r) = Recorded By, (t) = Taken By, (c) = Cosigned By    Initials Name Provider Type    MM Andria Daniels, PT Student PT Student        Mobility     Row Name 21 1201          Bed Mobility    Bed Mobility   supine-sit  -MM     Supine-Sit [place] (Bed Mobility)  moderate assist (50% patient effort)  -MM     Comment (Bed Mobility)  Increased time for bed mobility; assist with lifting R leg  -MM     Row Name 06/14/21 1201          Transfers    Comment (Transfers)  Increased pain with STS from toilet  -MM     Row Name 06/14/21 1201          Bed-Chair Transfer    Bed-Chair [place] (Transfers)  minimum assist (75% patient effort);2 person assist Stand pivot transfer  -MM     Row Name 06/14/21 1201          Sit-Stand Transfer    Sit-Stand [place] (Transfers)  moderate assist (50% patient effort)  -MM     Assistive Device (Sit-Stand Transfers)  walker, front-wheeled  -MM     Row Name 06/14/21 1201          Gait/Stairs (Locomotion)    [place] Level (Gait)  minimum assist (75% patient effort)  -MM     Assistive Device (Gait)  walker, front-wheeled  -MM     Distance in Feet (Gait)  20'  -MM     Deviations/Abnormal Patterns (Gait)  antalgic;gait speed decreased;stride length decreased;weight shifting decreased  -MM     [place] Level (Stairs)  not tested  -MM     Comment (Gait/Stairs)  Forward flexed posture; responded well to cues  -MM     Row Name 06/14/21 1201          Mobility    Extremity Weight-bearing Status  right lower extremity  -MM     Right Lower Extremity (Weight-bearing Status)  weight-bearing as tolerated (WBAT)  -MM       User Key  (r) = Recorded By, (t) = Taken By, (c) = Cosigned By    Initials Name Provider Type    MM Andria Daniels, PT Student PT Student        Obj/Interventions     Row Name 06/14/21 1205          Range of Motion Comprehensive    General Range of Motion  bilateral upper extremity ROM WFL;bilateral lower extremity ROM WFL  -MM     Row Name 06/14/21 1205          Strength Comprehensive (MMT)    Comment, General Manual Muscle Testing (MMT) Assessment  R hip flexion and R knee extension 3-/5; all other B LEs >3/5  -MM     Row Name 06/14/21 1205          Balance    Balance  Assessment  sitting static balance;sitting dynamic balance;standing static balance  -MM     Static Sitting Balance  mild impairment  -MM     Dynamic Sitting Balance  moderate impairment  -MM     Static Standing Balance  mild impairment  -MM     Comment, Balance  Static standing balance may be impaired by pain and WBAT status  -MM     Row Name 06/14/21 1205          Sensory Assessment (Somatosensory)    Sensory Assessment (Somatosensory)  LE sensation intact  -MM       User Key  (r) = Recorded By, (t) = Taken By, (c) = Cosigned By    Initials Name Provider Type    MM Andria Daniels, PT Student PT Student        Goals/Plan     Row Name 06/14/21 1210          Bed Mobility Goal 1 (PT)    Activity/Assistive Device (Bed Mobility Goal 1, PT)  bed mobility activities, all  -MM     [place] Level/Cues Needed (Bed Mobility Goal 1, PT)  modified independence  -MM     Time Frame (Bed Mobility Goal 1, PT)  long term goal (LTG);2 weeks  -MM     Row Name 06/14/21 1210          Transfer Goal 1 (PT)    Activity/Assistive Device (Transfer Goal 1, PT)  transfers, all;walker, rolling  -MM     [place] Level/Cues Needed (Transfer Goal 1, PT)  modified independence  -MM     Time Frame (Transfer Goal 1, PT)  long term goal (LTG);2 weeks  -MM     Row Name 06/14/21 1210          Gait Training Goal 1 (PT)    Activity/Assistive Device (Gait Training Goal 1, PT)  gait (walking locomotion);walker, rolling  -MM     [place] Level (Gait Training Goal 1, PT)  modified independence  -MM     Time Frame (Gait Training Goal 1, PT)  long term goal (LTG);2 weeks  -MM       User Key  (r) = Recorded By, (t) = Taken By, (c) = Cosigned By    Initials Name Provider Type    MM Andria Daniels, PT Student PT Student        Clinical Impression     [facility] Name 06/14/21 1208          Pain    Additional Documentation  Pain Scale: FACES Pre/Post-Treatment (Group)  -MM     Row Name 06/14/21 1208          Pain Scale: FACES Pre/Post-Treatment    Pain:  FACES Scale, Pretreatment  0-->no hurt  -MM     Posttreatment Pain Rating  2-->hurts little bit  -MM     Pain Location - Side  Right  -MM     Pain Location  other (see comments) Pelvis  -MM     Row Name 06/14/21 1206          Plan of Care Review    Plan of Care Reviewed With  patient May need review; pt poor historian  -MM     Outcome Summary  86 y/o female presents with closed nondisplaced fracture of sacrum and multiple rami of R pubis; fall three days ago. PMH dementia. Pt WBAT status. Pt oriented to self only. PLOF unknown; pt from [place] [facility]; pt poor historian. Currently, pt is mod A bed mobility with increased time needed and assist to lift R leg; min A x2 stand pivot transfer from bed to chair; min A ambulation with RW; mod A STS from toilet. Pt remained in forward flexed posture during ambulation, but responded well to cues. Pt pain increased with STS from toilet; pt did not complain of much pain otherwise during treatment. Pt did become emotionally labile repeatedly during treatment, but was able to be redirected. OT noted that urine was cloudy and reported to RN due to concern for UTI. IP rehab recommended upon discharge; pt presents with pain, weakness, and a suspected decline in functional mobility from baseline.  -MM     Row Name 06/14/21 1204          Therapy Assessment/Plan (PT)    Rehab Potential (PT)  good, to achieve stated therapy goals  -MM     Criteria for Skilled Interventions Met (PT)  yes;meets criteria  -MM     Predicted Duration of Therapy Intervention (PT)  until discharge  -MM     Row Name 06/14/21 1201          Vital Signs    Pretreatment Heart Rate (beats/min)  67 In bed with HOB elevated  -MM     O2 Delivery Pre Treatment  room air  -MM     O2 Delivery Intra Treatment  room air  -MM     O2 Delivery Post Treatment  room air  -MM     Pre Patient Position  Supine  -MM     Post Patient Position  Sitting  -MM     Row Name 06/14/21 1205          Positioning and Restraints     Pre-Treatment Position  in bed  -MM     Post Treatment Position  chair  -MM     In Chair  reclined;sitting;call light within reach;encouraged to call for assist;exit alarm on  -MM       User Key  (r) = Recorded By, (t) = Taken By, (c) = Cosigned By    Initials Name Provider Type    Andria Ravi, PT Student PT Student        Outcome Measures     Row Name 06/14/21 1211          How much help from another person do you currently need...    Turning from your back to your side while in flat bed without using bedrails?  2  -MM     Moving from lying on back to sitting on the side of a flat bed without bedrails?  2  -MM     Moving to and from a bed to a chair (including a wheelchair)?  2  -MM     Standing up from a chair using your arms (e.g., wheelchair, bedside chair)?  2  -MM     Climbing 3-5 steps with a railing?  1  -MM     To walk in hospital room?  2  -MM     AM-PAC 6 Clicks Score (PT)  11  -MM     Row Name 06/14/21 1211          Functional Assessment    Outcome Measure Options  AM-PAC 6 Clicks Basic Mobility (PT)  -MM       User Key  (r) = Recorded By, (t) = Taken By, (c) = Cosigned By    Initials Name Provider Type    Andria Ravi, PT Student PT Student        Physical Therapy Education                 Title: PT OT SLP Therapies (Done)     Topic: Physical Therapy (Done)     Point: Mobility training (Done)     Learning Progress Summary           Patient Acceptance, E, VU,NR by MM at 6/14/2021 1212    Comment: [facility] dementia                   Point: Home exercise program (Done)     Learning Progress Summary           Patient Acceptance, E, VU,NR by MM at 6/14/2021 1212    Comment: [facility] dementia                   Point: Body mechanics (Done)     Learning Progress Summary           Patient Acceptance, E, VU,NR by MM at 6/14/2021 1212    Comment: [facility] dementia                   Point: Precautions (Done)     Learning Progress Summary           Patient Acceptance, E, VU,NR by MM at 6/14/2021 1212    Comment: [facility]  dementia                               User Key     Initials Effective Dates Name Provider Type Discipline     05/18/21 -  Andria Daniels, PT Student PT Student PT              PT Recommendation and Plan  Planned Therapy Interventions (PT): balance training, bed mobility training, gait training, home exercise program, patient/family education, strengthening, transfer training  Plan of Care Reviewed With: patient (May need review; pt poor historian)  Outcome Summary: 86 y/o female presents with closed nondisplaced fracture of sacrum and multiple rami of R pubis; fall three days ago. PMH dementia. Pt WBAT status. Pt oriented to self only. PLOF unknown; pt from [place]; pt poor historian. Currently, pt is mod A bed mobility with increased time needed and assist to lift R leg; min A x2 stand pivot transfer from bed to chair; min A ambulation with RW; mod A STS from toilet. Pt remained in forward flexed posture during ambulation, but responded well to cues. Pt pain increased with STS from toilet; pt did not complain of much pain otherwise during treatment. Pt did become emotionally labile repeatedly during treatment, but was able to be redirected. OT noted that urine was cloudy and reported to RN due to concern for UTI. IP rehab recommended upon discharge; pt presents with pain, weakness, and a suspected decline in functional mobility from baseline.     Time Calculation:   PT Charges     Row Name 06/14/21 1157 06/14/21 0842          Time Calculation    Start Time  1122  -MM  --     Stop Time  1152  -MM  --     Time Calculation (min)  30 min  -MM  --     PT Received On  06/14/21  -MM  --     PT - Next Appointment  06/15/21  -MM  06/15/21  -     PT Goal Re-Cert Due Date  06/28/21  -MM  --       User Key  (r) = Recorded By, (t) = Taken By, (c) = Cosigned By    Initials Name Provider Type    SS Margoth Diaz, PT Physical Therapist     Andria Daniels, PT Student PT Student        Therapy Charges for  Today     Code Description Service Date Service Provider Modifiers Qty    [id number] HC-PT EVAL MOD COMPLEXITY 5 6/14/2021 Andria Daniels, PT Student  1          PT G-Codes  Outcome Measure Options: AM-PAC 6 Clicks Basic Mobility (PT)  AM-PAC 6 Clicks Score (PT): 11    Andria Daniels, PT Student  6/14/2021

## 2021-06-14 NOTE — PLAN OF CARE
"Goal Outcome Evaluation:              Outcome Summary: Pt is 88 yo female who fell at SNF suffering closed pubic rami fxs and R sacral fx. She demos cognitive impairment, and is unable to provide PLOF. Per nurse, pt was resident of SNF in memory care unit. Pt with Mod A for bed mobility and Min-Mod A for transfers. Ambulates to BR and has c/o urinary / urge incontinence, \"I must have a bladder infection.\" Patient requires Mod A for LB ADLs and Min A for UB ADLs. She is emotionally labile and not oriented to time/place/situation. Appears she would benefit from rehab onec returning to SNF, and OT spoke with NSG about possible UA due to pt's complaints and cloudy urine.  "

## 2021-06-14 NOTE — ED PROVIDER NOTES
Subjective   Patient is an 87-year-old white female with history of dementia presents by EMS today from [facility] with reports of abnormal x-ray.  History is obtained from nursing staff and paperwork given patient's mental status and dementia.  It is reported that the patient sustained a mechanical fall 3 days ago.  Reports that x-rays were obtained at the facility that showed pelvic fracture and she was sent to the ED for further evaluation.  Patient does complain of pain in the right hemipelvis with palpation.  No other information is obtainable at this time.          Review of Systems   Unable to perform ROS: Dementia   Musculoskeletal:        Right hip pain       History reviewed. No pertinent past medical history.    No Known Allergies    No past surgical history on file.    History reviewed. No pertinent family history.    Social History     Socioeconomic History   • Marital status:      Spouse name: Not on file   • Number of children: Not on file   • Years of education: Not on file   • Highest education level: Not on file           Objective   Physical Exam  Vital signs and triage nurse note reviewed.  Constitutional: Awake, alert; well-developed. No acute distress is noted.  Thin.  HEENT: Normocephalic, atraumatic; pupils are PERRL with intact EOM; oropharynx is pink and moist without exudate or erythema.  No drooling or pooling of oral secretions.  Neck: Supple, full range of motion without pain.  There is no midline tenderness crepitus or step-off noted.; no cervical lymphadenopathy. Normal phonation.  Cardiovascular: Irregular rate and rhythm, normal S1-S2.  No murmur noted.  Pulmonary: Respiratory effort regular nonlabored, breath sounds clear to auscultation all fields.  Abdomen: Soft, nontender, nondistended with normoactive bowel sounds; no rebound or guarding.  Musculoskeletal: Independent range of motion of all extremities.  There is pain with range of motion of the right  hip.  There is tenderness over the lateral aspect of the right hip as well as over the right groin and pubic bone.  There is a mild ecchymosis of the lateral aspect of the right hip.  There is no gross deformity.  There is no shortening or rotation of the extremity.  Distal neurovascular motor intact.  Neuro: Alert oriented to person, speech is clear and appropriate, GCS 14.    Skin: Flesh tone, warm, dry, intact; no erythematous or petechial rash or lesion.      Procedures           ED Course      Labs Reviewed   CBC WITH AUTO DIFFERENTIAL   APTT   COMPREHENSIVE METABOLIC PANEL   MAGNESIUM   PHOSPHORUS   PROTIME-INR   TROPONIN (IN-HOUSE)   HEMOGLOBIN A1C   TSH   URINALYSIS W/ CULTURE IF INDICATED     CT Pelvis Without Contrast    Result Date: 6/13/2021   1.  Acute nondisplaced fractures of the right superior and inferior pubic rami.  There is a subtle nondisplaced fracture through the right sacral ala. 2.  Severe osteopenia. 3.  Severe degenerative joint space narrowing of both hips 4.  Postoperative changes of the right proximal femur from prior fracture repair.  No definite hardware complication.  Electronically Signed By-Garrison Giles MD On:6/13/2021 8:09 PM This report was finalized on [id number] by  Garrison Giles MD.    Medications   nitroglycerin (NITROSTAT) SL tablet 0.4 mg (has no administration in time range)   acetaminophen (TYLENOL) tablet 650 mg (has no administration in time range)     Or   acetaminophen (TYLENOL) 160 MG/5ML solution 650 mg (has no administration in time range)     Or   acetaminophen (TYLENOL) suppository 650 mg (has no administration in time range)   aluminum-magnesium hydroxide-simethicone (MAALOX MAX) 400-400-40 MG/5ML suspension 15 mL (has no administration in time range)   ondansetron (ZOFRAN) tablet 4 mg (has no administration in time range)     Or   ondansetron (ZOFRAN) injection 4 mg (has no administration in time range)   melatonin tablet 5 mg (has no administration in  time range)   potassium chloride (K-DUR,KLOR-CON) CR tablet 40 mEq (has no administration in time range)   Magnesium Sulfate 2 gram infusion - Mg less than or equal to 1.5 mg/dL (has no administration in time range)     Or   Magnesium Sulfate 1 gram infusion - Mg 1.6-1.9 mg/dL (has no administration in time range)   potassium phosphate 45 mmol in sodium chloride 0.9 % 500 mL infusion (has no administration in time range)     Or   potassium phosphate 30 mmol in sodium chloride 0.9 % 250 mL infusion (has no administration in time range)     Or   potassium phosphate 15 mmol in sodium chloride 0.9 % 100 mL infusion (has no administration in time range)     Or   sodium phosphates 45 mmol in sodium chloride 0.9 % 500 mL IVPB (has no administration in time range)     Or   sodium phosphates 30 mmol in sodium chloride 0.9 % 250 mL IVPB (has no administration in time range)     Or   sodium phosphates 15 mmol in sodium chloride 0.9 % 250 mL IVPB (has no administration in time range)   calcium gluconate 1g/50ml 0.675% NaCl IV SOLN (has no administration in time range)     And   calcium gluconate 2-0.675 GM/100ML NACL IVPB (has no administration in time range)   HYDROcodone-acetaminophen (NORCO) 5-325 MG per tablet 1 tablet (has no administration in time range)   HYDROcodone-acetaminophen (NORCO) 7.5-325 MG per tablet 1 tablet (has no administration in time range)                                          MDM  Number of Diagnoses or Management Options  Closed fracture of multiple rami of right pubis, initial encounter (CMS/[facility])  Closed nondisplaced zone I fracture of sacrum, initial encounter (CMS/[facility])  Fall, initial encounter  Diagnosis management comments: Patient has CT of the pelvis that shows nondisplaced fractures of the superior and inferior pubic rami as well as a nondisplaced fracture of the sacral ala.    Patient has otherwise remained well-appearing and stable throughout her ED stay.    She will be admitted to the  [facility] for physical therapy and potential rehab placement.  She was discussed with hospitalist nurse practitioner.       Amount and/or Complexity of Data Reviewed  Tests in the radiology section of CPT®: reviewed and ordered    Patient Progress  Patient progress: stable      Final diagnoses:   Fall, initial encounter   Closed fracture of multiple rami of right pubis, initial encounter (CMS/[facility])   Closed nondisplaced zone I fracture of sacrum, initial encounter (CMS/[facility])       ED Disposition  ED Disposition     ED Disposition Condition Comment    Decision to Admit  Level of Care: Med/Surg [1]   Diagnosis: Fall, initial encounter [011948]   Admitting Physician: HE CHANDLER [[id number]]   Attending Physician: HE CHANDLER [[id number]]            No follow-up provider specified.       Medication List      No changes were made to your prescriptions during this visit.          Julieth Shepard, ROMAINE  06/13/21 3016

## 2021-06-14 NOTE — PLAN OF CARE
Goal Outcome Evaluation:   PT discharging back to [facility]

## 2021-06-14 NOTE — CASE MANAGEMENT/SOCIAL WORK
Discharge Planning Assessment   [place]     Patient Name: Luma Ventura  MRN: [id number]  Today's Date: 6/14/2021    Admit Date: 6/13/2021    Discharge Needs Assessment     Row Name 06/14/21 1117       Living Environment    Lives With  facility resident    Current Living Arrangements  extended care facility    Provides Primary Care For  no one, unable/limited ability to care for self    Family Caregiver if Needed  spouse    Family Caregiver Names  Spouse- Jn    Quality of Family Relationships  supportive    Able to Return to Prior Arrangements  yes       Resource/Environmental Concerns    Resource/Environmental Concerns  none    Transportation Concerns  car, none       Transition Planning    Patient/Family Anticipates Transition to  long-term care facility    Transportation Anticipated  health plan transportation       Discharge Needs Assessment    Readmission Within the Last 30 Days  no previous admission in last 30 days    Equipment Currently Used at Home  walker, standard;wheelchair    Concerns to be Addressed  care coordination/care conferences;discharge planning    Anticipated Changes Related to Illness  none    Equipment Needed After Discharge  none    Provided Post Acute Provider List?  N/A        Discharge Plan     Row Name 06/14/21 1119       Plan    Plan  Okay to return to [facility] per pt's family/facility, no PASRR or precert needed for return.    Patient/Family in Agreement with Plan  yes    Plan Comments  CM contacted patient's spouse, Jn ([phone]) to discuss dc planning. Confirmed patient was from [place]. He reported she has been there for about 2 months and that is where he wanted her to return to. He requested to be contacted once an estimated dc time is known since he lives an hour from [facility] and wanted to be there when she got there. CM discussed with RN. Confirmed with facility liaison that patient is from [place] memory care and is okay to return at [place].        Continued  Care and Services - Admitted Since 6/13/2021     Destination Coordination complete    Service Provider Request Status Selected Services Address Phone Fax Patient Preferred    [facility]   [facility] [address] [place] [address] [phone] --                Demographic Summary     Row Name 06/14/21 1116       General Information    Admission Type  observation    Required Notices Provided  Observation Status Notice MOON 6/14/21    Reason for Consult  discharge planning    Preferred Language  English     Used During This Interaction  no       Contact Information    Permission Granted to Share Info With          Functional Status     Row Name 06/14/21 1117       Functional Status    Usual Activity Tolerance  fair    Current Activity Tolerance  fair       Functional Status, IADL    Medications  completely dependent    Meal Preparation  completely dependent    Housekeeping  completely dependent    Laundry  completely dependent    Shopping  completely dependent        Phone communication or documentation only - no physical contact with patient or family.    Lea Stanton RN     Office Phone: [phone]  Office Cell: [phone]

## 2021-12-28 PROBLEM — I48.0 PAROXYSMAL ATRIAL FIBRILLATION (HCC): Status: ACTIVE | Noted: 2021-12-28
